# Patient Record
Sex: MALE | Race: BLACK OR AFRICAN AMERICAN | NOT HISPANIC OR LATINO | Employment: UNEMPLOYED | URBAN - METROPOLITAN AREA
[De-identification: names, ages, dates, MRNs, and addresses within clinical notes are randomized per-mention and may not be internally consistent; named-entity substitution may affect disease eponyms.]

---
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
## 2024-09-23 ENCOUNTER — HOSPITAL ENCOUNTER (EMERGENCY)
Facility: HOSPITAL | Age: 19
Discharge: HOME | End: 2024-09-23

## 2024-09-23 VITALS
HEART RATE: 57 BPM | RESPIRATION RATE: 16 BRPM | DIASTOLIC BLOOD PRESSURE: 78 MMHG | TEMPERATURE: 98.5 F | SYSTOLIC BLOOD PRESSURE: 141 MMHG | OXYGEN SATURATION: 100 %

## 2024-09-23 DIAGNOSIS — K04.7 DENTAL INFECTION: Primary | ICD-10-CM

## 2024-09-23 PROCEDURE — 99282 EMERGENCY DEPT VISIT SF MDM: CPT

## 2024-09-23 PROCEDURE — 99284 EMERGENCY DEPT VISIT MOD MDM: CPT

## 2024-09-23 RX ORDER — OXYCODONE HYDROCHLORIDE 5 MG/1
2.5 TABLET ORAL EVERY 4 HOURS PRN
Qty: 5 TABLET | Refills: 0 | Status: SHIPPED | OUTPATIENT
Start: 2024-09-23 | End: 2024-09-26

## 2024-09-23 RX ORDER — OXYCODONE HYDROCHLORIDE 5 MG/1
5 TABLET ORAL ONCE
Status: COMPLETED | OUTPATIENT
Start: 2024-09-23 | End: 2024-09-23

## 2024-09-23 RX ADMIN — AMOXICILLIN AND CLAVULANATE POTASSIUM 1 TABLET: 875; 125 TABLET, FILM COATED ORAL at 02:25

## 2024-09-23 RX ADMIN — OXYCODONE HYDROCHLORIDE 5 MG: 5 TABLET ORAL at 02:25

## 2024-09-23 NOTE — DISCHARGE INSTRUCTIONS
Please take 650mg acetaminophen every 6 hours as needed for pain.  You may also take 400mg ibuprofen(motrin) every 8 hours as needed for pain.

## 2024-10-07 ENCOUNTER — OFFICE VISIT (OUTPATIENT)
Dept: URGENT CARE | Facility: CLINIC | Age: 19
End: 2024-10-07

## 2024-10-07 VITALS
DIASTOLIC BLOOD PRESSURE: 73 MMHG | HEART RATE: 66 BPM | OXYGEN SATURATION: 100 % | RESPIRATION RATE: 18 BRPM | TEMPERATURE: 98 F | BODY MASS INDEX: 23.88 KG/M2 | SYSTOLIC BLOOD PRESSURE: 126 MMHG | WEIGHT: 181 LBS

## 2024-10-07 DIAGNOSIS — S46.911A MUSCLE STRAIN OF RIGHT SHOULDER REGION, INITIAL ENCOUNTER: Primary | ICD-10-CM

## 2024-10-07 PROCEDURE — 99213 OFFICE O/P EST LOW 20 MIN: CPT

## 2024-10-07 NOTE — PROGRESS NOTES
[facility]        NAME: Abdias Morrow is a 19 y.o. male  : 2005    MRN: [id number]  DATE: 2024  TIME: 4:14 PM    Assessment and Plan   Muscle strain of right shoulder region, initial encounter [S46.911A]  1. Muscle strain of right shoulder region, initial encounter              Patient Instructions   Ice to affected area first 48 hours, heat afterwards. 15 minutes every 3 hours as needed throughout the day.  Topical pain medication such as icy/hot, Biofreeze, Salon pas, etc.  Ibuprofen - 600 mg orally every 6-8 hours when required, maximum 2400 mg/day OR Naproxen - 500 mg orally twice daily when required, maximum 1250 mg/day    Acetaminophen - 650 mg orally every 4-6 hours when required, maximum 4000 mg/day      Follow up with Primary Care Provider in 3-5 days if not improving.  Proceed to Emergency Department if symptoms worsen.    If tests have been performed at [facility], our office will contact you with results if changes need to be made to the care plan discussed with you at the visit.  You can review your full results on [facility].    Chief Complaint     Chief Complaint   Patient presents with    Shoulder Pain     Right shoulder pain - was at [employer] yesterday  Requests work note         History of Present Illness       Patient reports he was at the [employer] range yesterday and shot 10 rounds of an AK 10.  He states the recoil hit him multiple times in the shoulder and today he has been having some swelling and some pain.  He does have full range of motion, denies any numbness or tingling in his hands and also denies any neck pain.    Shoulder Pain   Pertinent negatives include no fever.       Review of Systems   Review of Systems   Constitutional:  Negative for chills and fever.   Eyes:  Negative for pain and visual disturbance.   Respiratory:  Negative for cough and shortness of breath.    Cardiovascular:  Negative for chest pain and palpitations.   Gastrointestinal:   Negative for abdominal pain and vomiting.   Musculoskeletal:  Positive for joint swelling and myalgias. Negative for arthralgias and back pain.   Skin:  Negative for color change and rash.   Neurological:  Negative for dizziness, seizures, syncope, weakness and light-headedness.   All other systems reviewed and are negative.        Current Medications       Current Outpatient Medications:     naproxen (NAPROSYN) 500 mg tablet, Take 1 tablet (500 mg total) by mouth 2 (two) times a day with meals for 5 days, Disp: 10 tablet, Rfl: 0    Current Allergies     Allergies as of 10/07/2024    (No Known Allergies)            The following portions of the patient's history were reviewed and updated as appropriate: allergies, current medications, past family history, past medical history, past social history, past surgical history and problem list.     History reviewed. No pertinent past medical history.    History reviewed. No pertinent surgical history.    History reviewed. No pertinent family history.      Medications have been verified.        Objective   /73   Pulse 66   Temp 98 °F (36.7 °C)   Resp 18   Wt 82.1 kg (181 lb)   SpO2 100%   BMI 23.88 kg/m²   No LMP for male patient.       Physical Exam     Physical Exam  Vitals and nursing note reviewed.   Constitutional:       Appearance: Normal appearance.   HENT:      Head: Normocephalic and atraumatic.   Pulmonary:      Effort: Pulmonary effort is normal.   Musculoskeletal:        Arms:    Skin:     General: Skin is warm and dry.      Capillary Refill: Capillary refill takes less than 2 seconds.   Neurological:      General: No focal deficit present.      Mental Status: He is alert and oriented to person, place, and time. Mental status is at baseline.      Sensory: No sensory deficit.      Motor: No weakness.   Psychiatric:         Mood and Affect: Mood normal.         Behavior: Behavior normal.         Thought Content: Thought content normal.

## 2024-10-07 NOTE — LETTER
October 7, 2024     Patient: Abdias Morrow   YOB: 2005   Date of Visit: 10/7/2024       To Whom it May Concern:    Abdias Morrow was seen in my clinic on 10/7/2024. He may return to work on 10/9/2024 but may return sooner if his symptoms has improved before then.    If you have any questions or concerns, please don't hesitate to call.         Sincerely,          EDU Wiley        CC: No Recipients

## 2024-10-07 NOTE — PATIENT INSTRUCTIONS
Ice to affected area first 48 hours, heat afterwards. 15 minutes every 3 hours as needed throughout the day.  Topical pain medication such as icy/hot, Biofreeze, Salon pas, etc.  Ibuprofen - 600 mg orally every 6-8 hours when required, maximum 2400 mg/day OR Naproxen - 500 mg orally twice daily when required, maximum 1250 mg/day    Acetaminophen - 650 mg orally every 4-6 hours when required, maximum 4000 mg/day      Follow up with Primary Care Provider in 3-5 days if not improving.  Proceed to Emergency Department if symptoms worsen.    If tests have been performed at [facility], our office will contact you with results if changes need to be made to the care plan discussed with you at the visit.  You can review your full results on [facility] MyChart.

## 2024-10-12 NOTE — ED PROVIDER NOTES
Time reflects when diagnosis was documented in both MDM as applicable and the Disposition within this note       Time User Action Codes Description Comment    9/23/2024  2:12 AM Gloria Mosqueda Add [K04.7] Dental infection           ED Disposition       ED Disposition   Discharge    Condition   Stable    Date/Time   Mon Sep 23, 2024  2:12 AM    Comment   Abdias Morrow discharge to home/self care.                   Assessment & Plan       Medical Decision Making  Patient presents with left upper molar pain for the past several days, however worse over the past 3 hours.  Denies fever or chills, denies significant swelling of the face.  Denies difficulty breathing or swallowing.  Denies swelling of the tongue or throat.  Has history of significant dental caries and has been evaluated for the same in the past.  Physical exam with multiple left upper teeth with tenderness to percussion from the premolar through the molars with surrounding gingival swelling without discrete abscess.  No indication for labs or imaging given patient is afebrile and without evidence of facial swelling, oral swelling, or evidence of intracerebral spread of infection.  Patient declines dental block.  After discussion, will treat with Augmentin and short course of oxycodone for severe pain.  Patient is aware that he should be taking Tylenol and/or Motrin for mild to moderate pain and oxycodone is only to be used for severe pain.  Opioid and return precautions discussed.  Is to follow-up with OMFS within 1 week, referral placed.    Amount and/or Complexity of Data Reviewed  External Data Reviewed: notes.    Risk  Prescription drug management.             Medications   amoxicillin-clavulanate (AUGMENTIN) 875-125 mg per tablet 1 tablet (1 tablet Oral Given 9/23/24 0225)   oxyCODONE (ROXICODONE) IR tablet 5 mg (5 mg Oral Given 9/23/24 0225)       ED Risk Strat Scores             CRAFFT      Flowsheet Row Most Recent Value   CRAFFT Initial  "Screen: During the past 12 months, did you:    1. Drink any alcohol (more than a few sips)?  No Filed at: 09/23/2024 0223   2. Smoke any marijuana or hashish No Filed at: 09/23/2024 0223   3. Use anything else to get high? (\"anything else\" includes illegal drugs, over the counter and prescription drugs, and things that you sniff or 'burdick')? No Filed at: 09/23/2024 0223                                          History of Present Illness       Chief Complaint   Patient presents with    Dental Pain     Pt reports left sided dental pain x3 hrs        History reviewed. No pertinent past medical history.   History reviewed. No pertinent surgical history.   History reviewed. No pertinent family history.   Social History     Tobacco Use    Smoking status: Never    Smokeless tobacco: Never   Vaping Use    Vaping status: Every Day      E-Cigarette/Vaping    E-Cigarette Use Current Every Day User       E-Cigarette/Vaping Substances      I have reviewed and agree with the history as documented.     Tres is a 19-year-old male with no known medical problems who presents with left upper dental pain.  Had onset of mild left upper dental pain about 3 days ago which was nonradiating, pain became acutely severe about 3 hours ago after eating.  Has surrounding swelling of his gums but does not feel any swelling in the throat, of the tongue, or of the face.  Denies fever, chills, lymphadenopathy, neck stiffness, pain with extraocular movements, rash, or difficulty breathing.  Has had similar symptoms in the past and was advised to get these teeth removed, however has not been able to follow-up for same.  No relief with both Tylenol and Motrin taken earlier in the evening.        Review of Systems   Constitutional: Negative.  Negative for chills, fatigue and fever.   HENT:  Positive for dental problem. Negative for congestion, facial swelling, postnasal drip, rhinorrhea, sinus pressure, sinus pain, sneezing, sore throat, trouble " swallowing and voice change.    Eyes: Negative.  Negative for photophobia, pain, discharge, redness, itching and visual disturbance.   Respiratory: Negative.  Negative for shortness of breath and stridor.    Cardiovascular: Negative.    Gastrointestinal: Negative.  Negative for abdominal pain, nausea and vomiting.   Endocrine: Negative.    Genitourinary: Negative.    Musculoskeletal: Negative.  Negative for arthralgias, myalgias, neck pain and neck stiffness.   Skin: Negative.  Negative for pallor and rash.   Allergic/Immunologic: Negative.    Neurological: Negative.  Negative for dizziness, syncope, facial asymmetry, speech difficulty, weakness, light-headedness, numbness and headaches.   Hematological: Negative.  Negative for adenopathy.   Psychiatric/Behavioral: Negative.     All other systems reviewed and are negative.          Objective       ED Triage Vitals [09/23/24 0202]   Temperature Pulse Blood Pressure Respirations SpO2 Patient Position - Orthostatic VS   98.5 °F (36.9 °C) 57 141/78 16 100 % --      Temp Source Heart Rate Source BP Location FiO2 (%) Pain Score    Temporal Monitor Left arm -- 8      Vitals      Date and Time Temp Pulse SpO2 Resp BP Pain Score FACES Pain Rating User   09/23/24 0225 -- -- -- -- -- 8 -- MD   09/23/24 0202 98.5 °F (36.9 °C) 57 100 % 16 141/78 8 -- MD            Physical Exam  Vitals and nursing note reviewed. Exam conducted with a chaperone present.   Constitutional:       General: He is not in acute distress.     Appearance: Normal appearance. He is not ill-appearing or diaphoretic.   HENT:      Head: Normocephalic and atraumatic.      Right Ear: Tympanic membrane and external ear normal.      Left Ear: Tympanic membrane and external ear normal.      Nose: Nose normal.      Mouth/Throat:      Mouth: Mucous membranes are moist.      Pharynx: Oropharynx is clear. No oropharyngeal exudate.      Comments: Significant tenderness to percussion with surrounding gingival erythema  and swelling of teeth 13-16.  No abscess noted.  Airway grossly patent, no lingular or oropharyngeal edema.  Eyes:      General: No scleral icterus.        Right eye: No discharge.         Left eye: No discharge.      Extraocular Movements: Extraocular movements intact.      Conjunctiva/sclera: Conjunctivae normal.   Cardiovascular:      Rate and Rhythm: Normal rate and regular rhythm.      Pulses: Normal pulses.      Heart sounds: Normal heart sounds. No murmur heard.     No friction rub. No gallop.   Pulmonary:      Effort: Pulmonary effort is normal. No respiratory distress.      Breath sounds: Normal breath sounds.   Abdominal:      General: Abdomen is flat.   Musculoskeletal:         General: Normal range of motion.      Cervical back: Normal range of motion and neck supple. No rigidity or tenderness.   Lymphadenopathy:      Cervical: No cervical adenopathy.   Skin:     General: Skin is warm and dry.      Capillary Refill: Capillary refill takes less than 2 seconds.      Coloration: Skin is not pale.      Findings: No rash.   Neurological:      General: No focal deficit present.      Mental Status: He is alert.      Sensory: No sensory deficit.   Psychiatric:         Mood and Affect: Mood normal.         Behavior: Behavior normal.         Results Reviewed       None            No orders to display       Procedures    ED Medication and Procedure Management   Prior to Admission Medications   Prescriptions Last Dose Informant Patient Reported? Taking?   naproxen (NAPROSYN) 500 mg tablet   No No   Sig: Take 1 tablet (500 mg total) by mouth 2 (two) times a day with meals for 5 days      Facility-Administered Medications: None     Discharge Medication List as of 9/23/2024  2:33 AM        START taking these medications    Details   amoxicillin-clavulanate (AUGMENTIN) 875-125 mg per tablet Take 1 tablet by mouth every 12 (twelve) hours for 10 days, Starting Mon 9/23/2024, Until Thu 10/3/2024, Normal      oxyCODONE  (Roxicodone) 5 immediate release tablet Take 0.5 tablets (2.5 mg total) by mouth every 4 (four) hours as needed for moderate pain for up to 3 days Max Daily Amount: 15 mg, Starting Mon 9/23/2024, Until Thu 9/26/2024 at 2359, Normal           CONTINUE these medications which have NOT CHANGED    Details   naproxen (NAPROSYN) 500 mg tablet Take 1 tablet (500 mg total) by mouth 2 (two) times a day with meals for 5 days, Starting Sun 10/28/2018, Until Fri 11/2/2018, Print             ED SEPSIS DOCUMENTATION   Time reflects when diagnosis was documented in both MDM as applicable and the Disposition within this note       Time User Action Codes Description Comment    9/23/2024  2:12 AM Gloria Mosqueda Add [K04.7] Dental infection                  Gloria Mosqueda,   10/12/24 1006

## 2024-11-04 ENCOUNTER — APPOINTMENT (OUTPATIENT)
Dept: URGENT CARE | Facility: CLINIC | Age: 19
End: 2024-11-04

## 2024-11-18 ENCOUNTER — OFFICE VISIT (OUTPATIENT)
Dept: URGENT CARE | Facility: CLINIC | Age: 19
End: 2024-11-18

## 2024-11-18 VITALS
OXYGEN SATURATION: 100 % | RESPIRATION RATE: 20 BRPM | TEMPERATURE: 99.6 F | DIASTOLIC BLOOD PRESSURE: 78 MMHG | HEART RATE: 84 BPM | SYSTOLIC BLOOD PRESSURE: 124 MMHG

## 2024-11-18 DIAGNOSIS — K13.79 OTHER LESIONS OF ORAL MUCOSA: ICD-10-CM

## 2024-11-18 DIAGNOSIS — J02.8 ACUTE PHARYNGITIS DUE TO OTHER SPECIFIED ORGANISMS: ICD-10-CM

## 2024-11-18 DIAGNOSIS — B34.9 ACUTE VIRAL SYNDROME: ICD-10-CM

## 2024-11-18 DIAGNOSIS — Z75.8 DOES NOT HAVE PRIMARY CARE PROVIDER: ICD-10-CM

## 2024-11-18 DIAGNOSIS — R50.9 FEVER, UNSPECIFIED FEVER CAUSE: Primary | ICD-10-CM

## 2024-11-18 LAB — S PYO AG THROAT QL: NEGATIVE

## 2024-11-18 PROCEDURE — 87880 STREP A ASSAY W/OPTIC: CPT | Performed by: NURSE PRACTITIONER

## 2024-11-18 PROCEDURE — 87070 CULTURE OTHR SPECIMN AEROBIC: CPT | Performed by: NURSE PRACTITIONER

## 2024-11-18 PROCEDURE — 99214 OFFICE O/P EST MOD 30 MIN: CPT | Performed by: NURSE PRACTITIONER

## 2024-11-18 PROCEDURE — 87636 SARSCOV2 & INF A&B AMP PRB: CPT | Performed by: NURSE PRACTITIONER

## 2024-11-18 PROCEDURE — 87529 HSV DNA AMP PROBE: CPT | Performed by: NURSE PRACTITIONER

## 2024-11-18 RX ORDER — DIPHENHYDRAMINE HYDROCHLORIDE AND LIDOCAINE HYDROCHLORIDE AND ALUMINUM HYDROXIDE AND MAGNESIUM HYDRO
10 KIT EVERY 4 HOURS PRN
Qty: 180 ML | Refills: 0 | Status: SHIPPED | OUTPATIENT
Start: 2024-11-18

## 2024-11-18 NOTE — PROGRESS NOTES
[facility]        NAME: Abdias Morrow is a 19 y.o. male  : 2005    MRN: [id number]  DATE: 2024  TIME: 4:56 PM    Assessment and Plan   Fever, unspecified fever cause [R50.9]  1. Fever, unspecified fever cause  Covid/Flu- Office Collect Normal    HSV TYPE 1,2 DNA PCR [facility] ONLY    HSV TYPE 1,2 DNA PCR [facility] ONLY    Covid/Flu- Office Collect Normal    Ambulatory referral to Family Practice      2. Acute viral syndrome  Ambulatory referral to Family Practice      3. Other lesions of oral mucosa  HSV TYPE 1,2 DNA PCR [facility] ONLY    diphenhydramine, lidocaine, Al/Mg hydroxide, simethicone (Magic Mouthwash) SUSP    HSV TYPE 1,2 DNA PCR [facility] ONLY    Ambulatory referral to Family Practice      4. Acute pharyngitis due to other specified organisms  POCT rapid ANTIGEN strepA    Throat culture    HSV TYPE 1,2 DNA PCR [facility] ONLY    diphenhydramine, lidocaine, Al/Mg hydroxide, simethicone (Magic Mouthwash) SUSP    HSV TYPE 1,2 DNA PCR [facility] ONLY    Throat culture    Ambulatory referral to Family Practice      5. Does not have primary care provider  Ambulatory referral to Family Practice            Patient Instructions       Follow up with PCP in 3-5 days.  Proceed to  ER if symptoms worsen.    If tests have been performed at [facility], our office will contact you with results if changes need to be made to the care plan discussed with you at the visit.  You can review your full results on [facility].    Your strep A is negative. You have a throat culture pending. You are to download  [place] for the results in 3-4 days.  You will be notified if the results are + and an antibiotic will be called in for you.    You are to do warm salt water gargles 4 x daily.  Drink warm tea with honey and lemon.  Take tylenol or motrin as able for pain or fever.  Chloraseptic throat spray, cough drops.  Do not share utensils.  Change your tooth brush in 3 days.  Follow up  with your PCP in 2-3 days  Go to the ED if symptoms worsen      You have a flu/covid test pending.  You have a herpes oral test pending.   If tests have been performed at [facility], our office will contact you with results if changes need to be made to the care plan discussed with you at the visit.  You can review your full results on [facility]'s MyChart.    Your symptoms appear to be viral.   Rest. Stay hydrated.  Use the magic mouth wash  as ordered.      Chief Complaint     Chief Complaint   Patient presents with    Fever     Temp 104 2 days ago     Sore Throat     Sore throat and mouth  sores . Took one clindamycin     Dizziness     Dizzy when first wakes up and eyes hurt when look around     Leg Pain     Had otilio calf pain yesterday when took a shower , better today          History of Present Illness       This is a 19 year old male who states that 2 days ago had a temp of 104.  He has not had any more since.  He has a sorethroat and mouth sores.  States he had clindamycin at home and took one w/o relief.  He states that he is dizzy when he first wakes up and looks around and his eyes hurt.  He states he feels week with b/l calf pain yesterday.  He states he is feeling better today.  He state he is having trouble with eating.  Denies having a PCP. Denies rash on hands and feet.  He denies travel. PMH is listed and reviewed.          Review of Systems   Review of Systems   Constitutional:  Positive for fatigue and fever.   HENT:  Positive for congestion, mouth sores and sore throat.    Eyes: Negative.    Respiratory: Negative.     Cardiovascular: Negative.    Gastrointestinal: Negative.    Endocrine: Negative.    Genitourinary: Negative.    Musculoskeletal:  Positive for myalgias.   Skin: Negative.    Allergic/Immunologic: Negative.    Neurological:  Positive for dizziness.   Hematological: Negative.    Psychiatric/Behavioral: Negative.           Current Medications       Current Outpatient Medications:      diphenhydramine, lidocaine, Al/Mg hydroxide, simethicone (Magic Mouthwash) SUSP, Swish and spit 10 mL every 4 (four) hours as needed for mouth pain or discomfort or mucositis, Disp: 180 mL, Rfl: 0    naproxen (NAPROSYN) 500 mg tablet, Take 1 tablet (500 mg total) by mouth 2 (two) times a day with meals for 5 days, Disp: 10 tablet, Rfl: 0    Current Allergies     Allergies as of 11/18/2024    (No Known Allergies)            The following portions of the patient's history were reviewed and updated as appropriate: allergies, current medications, past family history, past medical history, past social history, past surgical history and problem list.     History reviewed. No pertinent past medical history.    History reviewed. No pertinent surgical history.    History reviewed. No pertinent family history.      Medications have been verified.        Objective   /78   Pulse 84   Temp 99.6 °F (37.6 °C)   Resp 20   SpO2 100%   No LMP for male patient.       Physical Exam     Physical Exam  Vitals and nursing note reviewed.   Constitutional:       General: He is not in acute distress.     Appearance: He is well-developed and normal weight. He is not ill-appearing, toxic-appearing or diaphoretic.   HENT:      Head: Normocephalic and atraumatic.      Right Ear: Tympanic membrane and ear canal normal.      Left Ear: Tympanic membrane and ear canal normal.      Nose: No congestion or rhinorrhea.      Mouth/Throat:      Mouth: Mucous membranes are moist. Oral lesions present.      Pharynx: Uvula midline. No pharyngeal swelling, oropharyngeal exudate, posterior oropharyngeal erythema or uvula swelling.      Tonsils: No tonsillar exudate or tonsillar abscesses.      Comments: Oral sores on tongue, oral mucosa (hsv swab obtained)  Eyes:      Extraocular Movements:      Right eye: Normal extraocular motion.      Left eye: Normal extraocular motion.   Cardiovascular:      Rate and Rhythm: Normal rate and regular rhythm.       Heart sounds: Normal heart sounds. No murmur heard.  Pulmonary:      Effort: Pulmonary effort is normal. No respiratory distress.      Breath sounds: Normal breath sounds. No stridor. No wheezing, rhonchi or rales.   Chest:      Chest wall: No tenderness.   Musculoskeletal:      Cervical back: Normal range of motion and neck supple.   Lymphadenopathy:      Cervical: No cervical adenopathy.   Skin:     General: Skin is warm and dry.      Capillary Refill: Capillary refill takes less than 2 seconds.   Neurological:      General: No focal deficit present.      Mental Status: He is alert and oriented to person, place, and time.   Psychiatric:         Mood and Affect: Mood normal.         Behavior: Behavior normal.

## 2024-11-18 NOTE — PATIENT INSTRUCTIONS
Your strep A is negative. You have a throat culture pending. You are to download  mychart for the results in 3-4 days.  You will be notified if the results are + and an antibiotic will be called in for you.    You are to do warm salt water gargles 4 x daily.  Drink warm tea with honey and lemon.  Take tylenol or motrin as able for pain or fever.  Chloraseptic throat spray, cough drops.  Do not share utensils.  Change your tooth brush in 3 days.  Follow up with your PCP in 2-3 days  Go to the ED if symptoms worsen      You have a flu/covid test pending.  You have a herpes oral test pending.   If tests have been performed at [facility], our office will contact you with results if changes need to be made to the care plan discussed with you at the visit.  You can review your full results on [facility] MyChart.    Your symptoms appear to be viral.   Rest. Stay hydrated.  Use the magic mouth wash  as ordered.

## 2024-11-19 ENCOUNTER — RESULTS FOLLOW-UP (OUTPATIENT)
Dept: URGENT CARE | Facility: CLINIC | Age: 19
End: 2024-11-19

## 2024-11-19 DIAGNOSIS — B00.9 HSV-1 INFECTION: Primary | ICD-10-CM

## 2024-11-19 LAB
FLUAV RNA RESP QL NAA+PROBE: NEGATIVE
FLUBV RNA RESP QL NAA+PROBE: NEGATIVE
HSV1 DNA SPEC QL NAA+PROBE: DETECTED
HSV1 DNA SPEC QL NAA+PROBE: NOT DETECTED
SARS-COV-2 RNA RESP QL NAA+PROBE: NEGATIVE

## 2024-11-19 RX ORDER — ACYCLOVIR 400 MG/1
400 TABLET ORAL 3 TIMES DAILY
Qty: 30 TABLET | Refills: 0 | Status: SHIPPED | OUTPATIENT
Start: 2024-11-19 | End: 2024-11-22

## 2024-11-19 NOTE — RESULT ENCOUNTER NOTE
HSV 1 PCR Detected Abnormal   HSV 2 PCR Not Detected    Acyclovir faxed to pharmacy listed on EMR from 11/18/2024  LM on home phone # for pt to call back today  Mobile # disconnected   No PCP listed for follow up

## 2024-11-20 ENCOUNTER — TELEPHONE (OUTPATIENT)
Dept: URGENT CARE | Facility: CLINIC | Age: 19
End: 2024-11-20

## 2024-11-20 LAB — BACTERIA THROAT CULT: NORMAL

## 2024-11-20 NOTE — TELEPHONE ENCOUNTER
Pt returned call regarding lab results.  He states he saw his results on Mychart.  Informed pt that he will need to  antiviral at the pharmacy listed.  He verbalizes understanding.

## 2024-11-22 ENCOUNTER — HOSPITAL ENCOUNTER (EMERGENCY)
Facility: HOSPITAL | Age: 19
Discharge: HOME | End: 2024-11-22
Attending: EMERGENCY MEDICINE

## 2024-11-22 VITALS
RESPIRATION RATE: 16 BRPM | BODY MASS INDEX: 23.99 KG/M2 | DIASTOLIC BLOOD PRESSURE: 89 MMHG | WEIGHT: 181 LBS | HEIGHT: 73 IN | TEMPERATURE: 97.8 F | HEART RATE: 69 BPM | OXYGEN SATURATION: 100 % | SYSTOLIC BLOOD PRESSURE: 122 MMHG

## 2024-11-22 DIAGNOSIS — Z11.59 PCR DNA POSITIVE FOR HSV1: ICD-10-CM

## 2024-11-22 DIAGNOSIS — B00.9 PCR DNA POSITIVE FOR HSV1: ICD-10-CM

## 2024-11-22 DIAGNOSIS — T78.40XA ALLERGIC REACTION, INITIAL ENCOUNTER: Primary | ICD-10-CM

## 2024-11-22 PROCEDURE — 99284 EMERGENCY DEPT VISIT MOD MDM: CPT | Performed by: EMERGENCY MEDICINE

## 2024-11-22 PROCEDURE — 99282 EMERGENCY DEPT VISIT SF MDM: CPT

## 2024-11-22 RX ORDER — DIPHENHYDRAMINE HYDROCHLORIDE AND LIDOCAINE HYDROCHLORIDE AND ALUMINUM HYDROXIDE AND MAGNESIUM HYDRO
10 KIT ONCE
Status: COMPLETED | OUTPATIENT
Start: 2024-11-22 | End: 2024-11-22

## 2024-11-22 RX ORDER — LIDOCAINE HYDROCHLORIDE 20 MG/ML
15 SOLUTION OROPHARYNGEAL 4 TIMES DAILY PRN
Qty: 100 ML | Refills: 0 | Status: SHIPPED | OUTPATIENT
Start: 2024-11-22

## 2024-11-22 RX ORDER — DIPHENHYDRAMINE HCL 25 MG
25 TABLET ORAL ONCE
Status: COMPLETED | OUTPATIENT
Start: 2024-11-22 | End: 2024-11-22

## 2024-11-22 RX ORDER — VALACYCLOVIR HYDROCHLORIDE 1 G/1
1000 TABLET, FILM COATED ORAL 3 TIMES DAILY
Qty: 30 TABLET | Refills: 0 | Status: SHIPPED | OUTPATIENT
Start: 2024-11-22 | End: 2024-12-02

## 2024-11-22 RX ORDER — EPINEPHRINE 0.3 MG/.3ML
0.3 INJECTION SUBCUTANEOUS ONCE AS NEEDED
Qty: 0.6 ML | Refills: 0 | Status: SHIPPED | OUTPATIENT
Start: 2024-11-22

## 2024-11-22 RX ADMIN — DIPHENHYDRAMINE HYDROCHLORIDE AND LIDOCAINE HYDROCHLORIDE AND ALUMINUM HYDROXIDE AND MAGNESIUM HYDRO 10 ML: KIT at 06:28

## 2024-11-22 RX ADMIN — DIPHENHYDRAMINE HYDROCHLORIDE 25 MG: 25 TABLET ORAL at 06:28

## 2024-11-22 NOTE — ED PROVIDER NOTES
Time reflects when diagnosis was documented in both MDM as applicable and the Disposition within this note       Time User Action Codes Description Comment    11/22/2024  6:26 AM Check, Hawk Add [Z11.59,  B00.9] PCR DNA positive for HSV1     11/22/2024  6:26 AM Check, Hawk Add [T78.40XA] Allergic reaction, initial encounter     11/22/2024  6:32 AM CheckHawk Modify [Z11.59,  B00.9] PCR DNA positive for HSV1     11/22/2024  6:32 AM Check, Hawk Modify [T78.40XA] Allergic reaction, initial encounter           ED Disposition       ED Disposition   Discharge    Condition   Stable    Date/Time   Fri Nov 22, 2024  6:29 AM    Comment   Abdias Morrow discharge to home/self care.                   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  Suspect symptoms likely secondary to allergic reaction to the acyclovir, no evidence of anaphylaxis.  Will treat with Benadryl, Magic mouthwash.  Will have him stop the acyclovir and transition to Valtrex.  Will also provide with prescription for the components of the Magic mouthwash to see if that will be more affordable for him, will also give him a prescription for an EpiPen.  He was also advised to take Benadryl as needed for itching .he was advised to return for any worsening symptoms.  He was given strict return precautions.    Problems Addressed:  Allergic reaction, initial encounter: acute illness or injury  PCR DNA positive for HSV1: acute illness or injury    Amount and/or Complexity of Data Reviewed  Independent Historian: friend  External Data Reviewed: labs and notes.    Risk  OTC drugs.  Prescription drug management.        ED Course as of 11/22/24 0642   Fri Nov 22, 2024   0618 EKG interpreted by myself demonstrates sinus tachycardia with a rate of 109, normal axis, normal intervals, normal ST segment and T waves       Medications   diphenhydramine, lidocaine,  "Al/Mg hydroxide, simethicone (Magic Mouthwash) oral solution 10 mL (10 mL Swish & Spit Given 11/22/24 0628)   diphenhydrAMINE (BENADRYL) tablet 25 mg (25 mg Oral Given 11/22/24 0628)       ED Risk Strat Scores             CRAFFT      Flowsheet Row Most Recent Value   CRAFFT Initial Screen: During the past 12 months, did you:    1. Drink any alcohol (more than a few sips)?  No Filed at: 11/22/2024 0616   2. Smoke any marijuana or hashish No Filed at: 11/22/2024 0616   3. Use anything else to get high? (\"anything else\" includes illegal drugs, over the counter and prescription drugs, and things that you sniff or 'burdick')? No Filed at: 11/22/2024 0616                                          History of Present Illness       Chief Complaint   Patient presents with    Itching     Itching/hives since starting acyclovir 2 days ago. Positive for HSV1       History reviewed. No pertinent past medical history.   History reviewed. No pertinent surgical history.   History reviewed. No pertinent family history.   Social History     Tobacco Use    Smoking status: Never    Smokeless tobacco: Never   Vaping Use    Vaping status: Every Day      E-Cigarette/Vaping    E-Cigarette Use Current Every Day User       E-Cigarette/Vaping Substances      I have reviewed and agree with the history as documented.     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  Patient states he took a total of 6 doses of the acyclovir, yesterday started with a red slightly raised itchy rash which prompted his evaluation.  He denies any facial swelling, difficulty swallowing or voice change, no chest tightness, shortness of breath, abdominal pain, nausea or vomiting.  No known allergies to anything.  Denies any other new exposures.  Continues with the lesions on his lips and tongue that are painful.  Was unable to  the Magic mouthwash as it was not covered by " insurance and was too expensive.        Review of Systems   Constitutional:  Negative for chills and fever.   HENT:  Positive for mouth sores. Negative for ear pain and sore throat.    Eyes:  Negative for pain and visual disturbance.   Respiratory:  Negative for cough and shortness of breath.    Cardiovascular:  Negative for chest pain and palpitations.   Gastrointestinal:  Negative for abdominal pain and vomiting.   Genitourinary:  Negative for dysuria and hematuria.   Musculoskeletal:  Negative for arthralgias and back pain.   Skin:  Positive for rash. Negative for color change.   Neurological:  Negative for seizures and syncope.   All other systems reviewed and are negative.          Objective       ED Triage Vitals [11/22/24 0614]   Temperature Pulse Blood Pressure Respirations SpO2 Patient Position - Orthostatic VS   97.8 °F (36.6 °C) 69 122/89 16 100 % Sitting      Temp Source Heart Rate Source BP Location FiO2 (%) Pain Score    Temporal Monitor Left arm -- 8      Vitals      Date and Time Temp Pulse SpO2 Resp BP Pain Score FACES Pain Rating User   11/22/24 0623 -- -- -- -- -- 4 -- SV   11/22/24 0614 97.8 °F (36.6 °C) 69 100 % 16 122/89 8 -- TG            Physical Exam  Vitals and nursing note reviewed.   Constitutional:       General: He is not in acute distress.  HENT:      Head: Normocephalic and atraumatic.      Right Ear: External ear normal.      Left Ear: External ear normal.      Nose: Nose normal.      Mouth/Throat:      Mouth: Mucous membranes are moist.      Pharynx: No oropharyngeal exudate.      Comments: Herpetic lesions on the tongue and lips  Eyes:      Extraocular Movements: Extraocular movements intact.      Conjunctiva/sclera: Conjunctivae normal.      Pupils: Pupils are equal, round, and reactive to light.   Cardiovascular:      Rate and Rhythm: Normal rate and regular rhythm.      Pulses: Normal pulses.      Heart sounds: Normal heart sounds. No murmur heard.  Pulmonary:      Effort:  Pulmonary effort is normal.      Breath sounds: Normal breath sounds. No stridor. No wheezing.   Musculoskeletal:         General: No deformity. Normal range of motion.      Cervical back: Normal range of motion and neck supple.   Skin:     General: Skin is warm and dry.      Capillary Refill: Capillary refill takes less than 2 seconds.      Comments: Urticarial rash on the extremities, chest, abdomen, and back   Neurological:      General: No focal deficit present.      Mental Status: He is alert and oriented to person, place, and time.   Psychiatric:         Mood and Affect: Mood normal.         Behavior: Behavior normal.         Results Reviewed       None            No orders to display       Procedures    ED Medication and Procedure Management   Prior to Admission Medications   Prescriptions Last Dose Informant Patient Reported? Taking?   diphenhydramine, lidocaine, Al/Mg hydroxide, simethicone (Magic Mouthwash) SUSP   No No   Sig: Swish and spit 10 mL every 4 (four) hours as needed for mouth pain or discomfort or mucositis   naproxen (NAPROSYN) 500 mg tablet   No No   Sig: Take 1 tablet (500 mg total) by mouth 2 (two) times a day with meals for 5 days      Facility-Administered Medications: None     Patient's Medications   Discharge Prescriptions    ALUMINUM-MAGNESIUM HYDROXIDE 200-200 MG/5ML SUSPENSION    Take 5 mL by mouth every 6 (six) hours as needed for heartburn (Mouth pain or discomfort) Take with the viscous lidocaine up to 4 times daily as needed for mouth pain, swish and spit       Start Date: 11/22/2024End Date: --       Order Dose: 5 mL       Quantity: 355 mL    Refills: 0    EPINEPHRINE (EPIPEN) 0.3 MG/0.3 ML SOAJ    Inject 0.3 mL (0.3 mg total) into a muscle once as needed for anaphylaxis for up to 1 dose       Start Date: 11/22/2024End Date: --       Order Dose: 0.3 mg       Quantity: 0.6 mL    Refills: 0    LIDOCAINE VISCOUS HCL (XYLOCAINE) 2 % MUCOSAL SOLUTION    Swish and spit 15 mL 4 (four)  times a day as needed for mouth pain or discomfort       Start Date: 11/22/2024End Date: --       Order Dose: 15 mL       Quantity: 100 mL    Refills: 0    VALACYCLOVIR (VALTREX) 1,000 MG TABLET    Take 1 tablet (1,000 mg total) by mouth 3 (three) times a day for 10 days       Start Date: 11/22/2024End Date: 12/2/2024       Order Dose: 1,000 mg       Quantity: 30 tablet    Refills: 0     No discharge procedures on file.  ED SEPSIS DOCUMENTATION   Time reflects when diagnosis was documented in both MDM as applicable and the Disposition within this note       Time User Action Codes Description Comment    11/22/2024  6:26 AM CheckHawk [Z11.59,  B00.9] PCR DNA positive for HSV1     11/22/2024  6:26 AM Hawk Lancaster [T78.40XA] Allergic reaction, initial encounter     11/22/2024  6:32 AM CheckHawk [Z11.59,  B00.9] PCR DNA positive for HSV1     11/22/2024  6:32 AM CheckHawk [T78.40XA] Allergic reaction, initial encounter                  Hawk Lancaster MD  11/22/24 0642

## 2024-11-22 NOTE — DISCHARGE INSTRUCTIONS
Take Benadryl 25 mg every 6 hours as needed for allergic reaction, itching    Stop taking acyclovir, start taking Valtrex instead    Return to the emergency department if you experience worsening of symptoms including for develop any difficulty breathing, uncontrollable vomiting, facial swelling, or any other concerning symptoms    Use EpiPen as prescribed

## 2025-02-16 ENCOUNTER — HOSPITAL ENCOUNTER (EMERGENCY)
Facility: HOSPITAL | Age: 20
Discharge: HOME | End: 2025-02-16
Attending: EMERGENCY MEDICINE | Admitting: EMERGENCY MEDICINE

## 2025-02-16 VITALS
TEMPERATURE: 98.1 F | DIASTOLIC BLOOD PRESSURE: 78 MMHG | SYSTOLIC BLOOD PRESSURE: 163 MMHG | RESPIRATION RATE: 16 BRPM | OXYGEN SATURATION: 98 % | HEART RATE: 98 BPM

## 2025-02-16 DIAGNOSIS — B02.9 SHINGLES: Primary | ICD-10-CM

## 2025-02-16 PROCEDURE — 99284 EMERGENCY DEPT VISIT MOD MDM: CPT | Performed by: EMERGENCY MEDICINE

## 2025-02-16 PROCEDURE — 99282 EMERGENCY DEPT VISIT SF MDM: CPT

## 2025-02-16 RX ORDER — VALACYCLOVIR HYDROCHLORIDE 1 G/1
1000 TABLET, FILM COATED ORAL 3 TIMES DAILY
Qty: 42 TABLET | Refills: 0 | Status: SHIPPED | OUTPATIENT
Start: 2025-02-16 | End: 2025-03-02

## 2025-02-16 NOTE — ED PROVIDER NOTES
"Time reflects when diagnosis was documented in both MDM as applicable and the Disposition within this note       Time User Action Codes Description Comment    2/16/2025  8:51 AM Luis Alberto Barton Add [B02.9] Shingles           ED Disposition       ED Disposition   Discharge    Condition   Stable    Date/Time   Sun Feb 16, 2025  8:51 AM    Comment   Abdias Cristhian discharge to home/self care.                   Assessment & Plan       Medical Decision Making  3-year-old male presenting for rash to right leg, paresthesias in the right thigh.  Rash appears vesicular in nature.  Believe symptoms are secondary to shingles.  Is day 3 of the patient's symptoms  Original plan was to treat with valacyclovir.  Patient has had a generalized rash from acyclovir in the past.  He would like to hold off on antiviral treatment at this time.  Will write a prescription for valacyclovir if he decides to take it.  Told to return to the ED if symptoms worsen    Problems Addressed:  Shingles: acute illness or injury    Risk  Prescription drug management.        ED Course as of 02/16/25 0855   Sun Feb 16, 2025   0850 Plan was to start patient on valacyclovir.  Patient says that he developed a rash from acyclovir in the past.  Talk with him about the options.  Given that he is at 72 hours of symptoms options would be to treat with valacyclovir versus observation and symptomatic control.  Patient would like to hold off on the medication at this time.  Will write a prescription for him in case he decides to take it.       Medications - No data to display    ED Risk Strat Scores              CRAFFT      Flowsheet Row Most Recent Value   CRAFFT Initial Screen: During the past 12 months, did you:    1. Drink any alcohol (more than a few sips)?  No Filed at: 02/16/2025 0831   2. Smoke any marijuana or hashish No Filed at: 02/16/2025 0831   3. Use anything else to get high? (\"anything else\" includes illegal drugs, over the counter and prescription " drugs, and things that you sniff or 'burdick')? No Filed at: 02/16/2025 0831                                          History of Present Illness       Chief Complaint   Patient presents with    Blister     Blistering of right thigh         History reviewed. No pertinent past medical history.   History reviewed. No pertinent surgical history.   History reviewed. No pertinent family history.   Social History     Tobacco Use    Smoking status: Never    Smokeless tobacco: Never   Vaping Use    Vaping status: Every Day      E-Cigarette/Vaping    E-Cigarette Use Current Every Day User       E-Cigarette/Vaping Substances      I have reviewed and agree with the history as documented.     Patient is a 19-year-old male who presents for evaluation of a rash to his right leg as well as some numbness and tingling in the leg.  Patient says that this is the third day of the symptoms.  He says he feels like the numbness and tingling in the leg has gotten a little bit larger in area.  He says that he noticed the rash 3 days ago.  He says the rash has not gotten worse.  Denies any fevers or chills.  He also admits to some swollen lymph nodes in the right groin area.  He denies any testicular pain, penile swelling, penile lesions, penile discharge or any other genitourinary symptoms.  Patient says he has had cold sores in the past.        Review of Systems   Constitutional:  Negative for chills, fever and unexpected weight change.   HENT:  Negative for congestion, sore throat and trouble swallowing.    Eyes:  Negative for pain, discharge and itching.   Respiratory:  Negative for cough, chest tightness, shortness of breath and wheezing.    Cardiovascular:  Negative for chest pain, palpitations and leg swelling.   Gastrointestinal:  Negative for abdominal pain, blood in stool, diarrhea, nausea and vomiting.   Endocrine: Negative for polyuria.   Genitourinary:  Negative for difficulty urinating, dysuria, frequency and hematuria.    Musculoskeletal:  Negative for arthralgias and back pain.   Skin:  Positive for rash (R leg).   Neurological:  Positive for numbness (tingling to R thigh). Negative for dizziness, syncope, weakness, light-headedness and headaches.           Objective       ED Triage Vitals   Temperature Pulse Blood Pressure Respirations SpO2 Patient Position - Orthostatic VS   02/16/25 0829 02/16/25 0829 02/16/25 0830 02/16/25 0829 02/16/25 0829 02/16/25 0829   98.1 °F (36.7 °C) 98 163/78 16 98 % Sitting      Temp Source Heart Rate Source BP Location FiO2 (%) Pain Score    02/16/25 0829 02/16/25 0829 02/16/25 0829 -- 02/16/25 0829    Tympanic Monitor Left arm  No Pain      Vitals      Date and Time Temp Pulse SpO2 Resp BP Pain Score FACES Pain Rating User   02/16/25 0830 -- -- -- -- 163/78 -- -- NAD   02/16/25 0829 98.1 °F (36.7 °C) 98 98 % 16 -- No Pain -- NAD            Physical Exam  Vitals and nursing note reviewed.   Constitutional:       General: He is not in acute distress.     Appearance: He is well-developed.   HENT:      Head: Normocephalic and atraumatic.      Right Ear: External ear normal.      Left Ear: External ear normal.   Eyes:      Conjunctiva/sclera: Conjunctivae normal.      Pupils: Pupils are equal, round, and reactive to light.   Cardiovascular:      Rate and Rhythm: Normal rate and regular rhythm.      Heart sounds: Normal heart sounds. No murmur heard.     No friction rub. No gallop.   Pulmonary:      Effort: Pulmonary effort is normal. No respiratory distress.      Breath sounds: Normal breath sounds. No wheezing or rales.   Abdominal:      General: Bowel sounds are normal. There is no distension.      Palpations: Abdomen is soft.      Tenderness: There is no abdominal tenderness. There is no guarding.   Musculoskeletal:         General: No tenderness or deformity. Normal range of motion.      Cervical back: Normal range of motion.      Comments: R inguinal painless lymphadenopathy   Lymphadenopathy:       Cervical: No cervical adenopathy.   Skin:     General: Skin is warm and dry.      Findings: Rash present.      Comments: Vesicular rash to right lateral leg  No overlying signs of infection   Neurological:      General: No focal deficit present.      Mental Status: He is alert and oriented to person, place, and time. Mental status is at baseline.      Cranial Nerves: No cranial nerve deficit.      Sensory: No sensory deficit.      Motor: No weakness or abnormal muscle tone.   Psychiatric:         Behavior: Behavior normal.         Results Reviewed       None            No orders to display       Procedures    ED Medication and Procedure Management   Prior to Admission Medications   Prescriptions Last Dose Informant Patient Reported? Taking?   EPINEPHrine (EPIPEN) 0.3 mg/0.3 mL SOAJ   No No   Sig: Inject 0.3 mL (0.3 mg total) into a muscle once as needed for anaphylaxis for up to 1 dose   Lidocaine Viscous HCl (XYLOCAINE) 2 % mucosal solution   No No   Sig: Swish and spit 15 mL 4 (four) times a day as needed for mouth pain or discomfort   aluminum-magnesium hydroxide 200-200 MG/5ML suspension   No No   Sig: Take 5 mL by mouth every 6 (six) hours as needed for heartburn (Mouth pain or discomfort) Take with the viscous lidocaine up to 4 times daily as needed for mouth pain, swish and spit   diphenhydramine, lidocaine, Al/Mg hydroxide, simethicone (Magic Mouthwash) SUSP   No No   Sig: Swish and spit 10 mL every 4 (four) hours as needed for mouth pain or discomfort or mucositis   naproxen (NAPROSYN) 500 mg tablet   No No   Sig: Take 1 tablet (500 mg total) by mouth 2 (two) times a day with meals for 5 days   valACYclovir (VALTREX) 1,000 mg tablet   No No   Sig: Take 1 tablet (1,000 mg total) by mouth 3 (three) times a day for 10 days      Facility-Administered Medications: None     Patient's Medications   Discharge Prescriptions    VALACYCLOVIR (VALTREX) 1,000 MG TABLET    Take 1 tablet (1,000 mg total) by mouth 3  (three) times a day for 14 days       Start Date: 2/16/2025 End Date: 3/2/2025       Order Dose: 1,000 mg       Quantity: 42 tablet    Refills: 0     No discharge procedures on file.  ED SEPSIS DOCUMENTATION   Time reflects when diagnosis was documented in both MDM as applicable and the Disposition within this note       Time User Action Codes Description Comment    2/16/2025  8:51 AM Luis Alberto Barton Add [B02.9] Parth Barton DO  02/16/25 0855

## 2025-03-23 ENCOUNTER — HOSPITAL ENCOUNTER (EMERGENCY)
Facility: HOSPITAL | Age: 20
Discharge: HOME | End: 2025-03-23
Attending: EMERGENCY MEDICINE

## 2025-03-23 VITALS
RESPIRATION RATE: 20 BRPM | WEIGHT: 175 LBS | OXYGEN SATURATION: 100 % | BODY MASS INDEX: 23.09 KG/M2 | HEART RATE: 82 BPM | TEMPERATURE: 99.5 F

## 2025-03-23 DIAGNOSIS — K08.89 DENTALGIA: Primary | ICD-10-CM

## 2025-03-23 PROCEDURE — 99282 EMERGENCY DEPT VISIT SF MDM: CPT

## 2025-03-23 PROCEDURE — 99284 EMERGENCY DEPT VISIT MOD MDM: CPT | Performed by: EMERGENCY MEDICINE

## 2025-03-23 RX ORDER — ACETAMINOPHEN 325 MG/1
975 TABLET ORAL ONCE
Status: COMPLETED | OUTPATIENT
Start: 2025-03-23 | End: 2025-03-23

## 2025-03-23 RX ORDER — PENICILLIN V POTASSIUM 500 MG/1
500 TABLET, FILM COATED ORAL 4 TIMES DAILY
Qty: 40 TABLET | Refills: 0 | Status: SHIPPED | OUTPATIENT
Start: 2025-03-23 | End: 2025-03-23

## 2025-03-23 RX ORDER — OXYCODONE HYDROCHLORIDE 5 MG/1
5 TABLET ORAL ONCE
Refills: 0 | Status: COMPLETED | OUTPATIENT
Start: 2025-03-23 | End: 2025-03-23

## 2025-03-23 RX ORDER — PENICILLIN V POTASSIUM 250 MG/1
500 TABLET, FILM COATED ORAL ONCE
Status: COMPLETED | OUTPATIENT
Start: 2025-03-23 | End: 2025-03-23

## 2025-03-23 RX ORDER — PENICILLIN V POTASSIUM 500 MG/1
500 TABLET, FILM COATED ORAL 4 TIMES DAILY
Qty: 40 TABLET | Refills: 0 | Status: SHIPPED | OUTPATIENT
Start: 2025-03-23 | End: 2025-04-02

## 2025-03-23 RX ADMIN — OXYCODONE HYDROCHLORIDE 5 MG: 5 TABLET ORAL at 17:02

## 2025-03-23 RX ADMIN — PENICILLIN V POTASSIUM 500 MG: 250 TABLET, FILM COATED ORAL at 17:02

## 2025-03-23 RX ADMIN — ACETAMINOPHEN 975 MG: 325 TABLET ORAL at 17:07

## 2025-03-23 NOTE — ED PROVIDER NOTES
"Time reflects when diagnosis was documented in both MDM as applicable and the Disposition within this note       Time User Action Codes Description Comment    3/23/2025  4:58 PM JacobsOc Add [K08.89] Dentalgia           ED Disposition       ED Disposition   Discharge    Condition   Stable    Date/Time   Sun Mar 23, 2025  4:58 PM    Comment   Abdias Morrow discharge to home/self care.                   Assessment & Plan       Medical Decision Making  Risk  OTC drugs.  Prescription drug management.      Patient presents for dental pain due to suspected dental nelli. Patient not immunosuppressed, afebrile and well appearing with patent airway, have low suspicfion for deep space infection or any concern for airway compromise. Based on history, physical, and work up. No evidence of tooth fracture, avulsion, or bleeding socket. No evidence of RPA, PTA, Min’s angina, periapical abscess. Offered patient dental nerve block for pain which patient accepted/declined.     Instructed patient to continue to treat pain with ibuprofen/acetaminophen until they see a dentist. Patient discharged home and will follow up with dentist. Discussed return precautions for odontogenic infections and other dental pain emergencies. Will provide dental clinic list.     Portions of the record may have been created with voice recognition software. Occasional wrong word or \"sound a like\" substitutions may have occurred due to the inherent limitations of voice recognition software. Read the chart carefully and recognize, using context, where substitutions have occurred.        Medications   penicillin V potassium (VEETID) tablet 500 mg (500 mg Oral Given 3/23/25 1702)   oxyCODONE (ROXICODONE) IR tablet 5 mg (5 mg Oral Given 3/23/25 1702)   acetaminophen (TYLENOL) tablet 975 mg (975 mg Oral Given 3/23/25 1707)       ED Risk Strat Scores              CRAFFT      Flowsheet Row Most Recent Value   CRAFFT Initial Screen: During the past 12 " "months, did you:    1. Drink any alcohol (more than a few sips)?  No Filed at: 03/23/2025 1647   2. Smoke any marijuana or hashish No Filed at: 03/23/2025 1647   3. Use anything else to get high? (\"anything else\" includes illegal drugs, over the counter and prescription drugs, and things that you sniff or 'burdick')? No Filed at: 03/23/2025 1647                                          History of Present Illness       Chief Complaint   Patient presents with    Dental Pain     Right lower side         History reviewed. No pertinent past medical history.   History reviewed. No pertinent surgical history.   History reviewed. No pertinent family history.   Social History     Tobacco Use    Smoking status: Never    Smokeless tobacco: Never   Vaping Use    Vaping status: Every Day      E-Cigarette/Vaping    E-Cigarette Use Current Every Day User       E-Cigarette/Vaping Substances      I have reviewed and agree with the history as documented.     HPI      Nontoxic-appearing, 19-year-old, male, well-appearing, no relevant past medical history, presents to the emergency department with his significant other both of which are reliable competent and historians with no language barrier concerns, arrival by private vehicle.    Taking Tylenol Motrin without relief.  Has been going on for approximately a year worse over the last few days.  Patient was on a waiting list for oral surgery to have the molar removed, since his symptoms resolved intermittently over the few months he rescinded being on the list and now the pain is returned, and Emergency Department further evaluation.    No trismus, no dental trauma, no change in voice.    Review of Systems   Constitutional: Negative.  Negative for chills and fever.   HENT:  Positive for dental problem. Negative for congestion, ear pain, facial swelling, rhinorrhea, sore throat, trouble swallowing and voice change.    Eyes: Negative.    Respiratory: Negative.  Negative for chest tightness, " shortness of breath, wheezing and stridor.    Cardiovascular: Negative.  Negative for chest pain and leg swelling.   Gastrointestinal: Negative.  Negative for abdominal pain, diarrhea and vomiting.   Endocrine: Negative.    Genitourinary: Negative.    Musculoskeletal: Negative.    Skin: Negative.    Allergic/Immunologic: Negative.    Neurological: Negative.    Hematological: Negative.    Psychiatric/Behavioral: Negative.             Objective       ED Triage Vitals [03/23/25 1652]   Temperature Pulse BP Respirations SpO2 Patient Position - Orthostatic VS   99.5 °F (37.5 °C) 82 -- 20 100 % Sitting      Temp Source Heart Rate Source BP Location FiO2 (%) Pain Score    Temporal Monitor Left arm -- 7      Vitals      Date and Time Temp Pulse SpO2 Resp BP Pain Score FACES Pain Rating User   03/23/25 1702 -- -- -- -- -- 7 -- NAD   03/23/25 1652 99.5 °F (37.5 °C) 82 100 % 20 -- 7 -- NAD            Physical Exam  Vitals and nursing note reviewed.   Constitutional:       General: He is not in acute distress.     Appearance: Normal appearance. He is normal weight. He is not ill-appearing, toxic-appearing or diaphoretic.   HENT:      Head: Normocephalic and atraumatic.      Comments:   Ears appear normal.  External auditory canals patent without erythema or edema bilaterally.  TM grey/flat bilaterally.  Nose normal inspection, no deformity, nares patent bilaterally.  No septal hematoma, No epistaxis.  Mucous membranes moist, pink.  Tongue midline without edema.  Uvula midline without deviation.  Posterior pharynx widely patent.  No posterior erythema.  Tonsils without edema, erythema or purulent exudate.  No tongue or lip swelling present.  No defined dental abscess.  No sublingual or submandibular fullness or swelling.  No trismus.  No drooling or pooling of secretions. No stridor w/o evidence of brawniness under the tongue.      Right Ear: External ear normal.      Left Ear: External ear normal.      Nose: Nose normal.       Mouth/Throat:      Mouth: Mucous membranes are moist.        Comments: Between tooth #30 and 31, appears to have the gum over following the fractured tooth, no significant swelling along the line to suggest an abscess that could be drainable.  Eyes:      Extraocular Movements: Extraocular movements intact.      Conjunctiva/sclera: Conjunctivae normal.      Pupils: Pupils are equal, round, and reactive to light.   Cardiovascular:      Rate and Rhythm: Normal rate.      Pulses: Normal pulses.   Pulmonary:      Effort: Pulmonary effort is normal.   Abdominal:      General: Abdomen is flat. Bowel sounds are normal.   Musculoskeletal:         General: Normal range of motion.      Cervical back: Normal range of motion.   Skin:     General: Skin is warm.      Capillary Refill: Capillary refill takes less than 2 seconds.   Neurological:      General: No focal deficit present.      Mental Status: He is alert and oriented to person, place, and time.   Psychiatric:         Mood and Affect: Mood normal.         Behavior: Behavior normal.         Results Reviewed       None            No orders to display       Procedures    ED Medication and Procedure Management   Prior to Admission Medications   Prescriptions Last Dose Informant Patient Reported? Taking?   EPINEPHrine (EPIPEN) 0.3 mg/0.3 mL SOAJ   No No   Sig: Inject 0.3 mL (0.3 mg total) into a muscle once as needed for anaphylaxis for up to 1 dose   Lidocaine Viscous HCl (XYLOCAINE) 2 % mucosal solution   No No   Sig: Swish and spit 15 mL 4 (four) times a day as needed for mouth pain or discomfort   aluminum-magnesium hydroxide 200-200 MG/5ML suspension   No No   Sig: Take 5 mL by mouth every 6 (six) hours as needed for heartburn (Mouth pain or discomfort) Take with the viscous lidocaine up to 4 times daily as needed for mouth pain, swish and spit   diphenhydramine, lidocaine, Al/Mg hydroxide, simethicone (Magic Mouthwash) SUSP   No No   Sig: Swish and spit 10 mL every 4  (four) hours as needed for mouth pain or discomfort or mucositis   naproxen (NAPROSYN) 500 mg tablet   No No   Sig: Take 1 tablet (500 mg total) by mouth 2 (two) times a day with meals for 5 days   valACYclovir (VALTREX) 1,000 mg tablet   No No   Sig: Take 1 tablet (1,000 mg total) by mouth 3 (three) times a day for 10 days   valACYclovir (VALTREX) 1,000 mg tablet   No No   Sig: Take 1 tablet (1,000 mg total) by mouth 3 (three) times a day for 14 days      Facility-Administered Medications: None     Discharge Medication List as of 3/23/2025  5:13 PM        CONTINUE these medications which have CHANGED    Details   penicillin V potassium (VEETID) 500 mg tablet Take 1 tablet (500 mg total) by mouth 4 (four) times a day for 10 days, Starting Sun 3/23/2025, Until Wed 4/2/2025, Normal           CONTINUE these medications which have NOT CHANGED    Details   aluminum-magnesium hydroxide 200-200 MG/5ML suspension Take 5 mL by mouth every 6 (six) hours as needed for heartburn (Mouth pain or discomfort) Take with the viscous lidocaine up to 4 times daily as needed for mouth pain, swish and spit, Starting Fri 11/22/2024, Normal      diphenhydramine, lidocaine, Al/Mg hydroxide, simethicone (Magic Mouthwash) SUSP Swish and spit 10 mL every 4 (four) hours as needed for mouth pain or discomfort or mucositis, Starting Mon 11/18/2024, Normal      EPINEPHrine (EPIPEN) 0.3 mg/0.3 mL SOAJ Inject 0.3 mL (0.3 mg total) into a muscle once as needed for anaphylaxis for up to 1 dose, Starting Fri 11/22/2024, Normal      Lidocaine Viscous HCl (XYLOCAINE) 2 % mucosal solution Swish and spit 15 mL 4 (four) times a day as needed for mouth pain or discomfort, Starting Fri 11/22/2024, Normal      naproxen (NAPROSYN) 500 mg tablet Take 1 tablet (500 mg total) by mouth 2 (two) times a day with meals for 5 days, Starting Sun 10/28/2018, Until Fri 11/2/2018, Print      valACYclovir (VALTREX) 1,000 mg tablet Take 1 tablet (1,000 mg total) by mouth 3  (three) times a day for 10 days, Starting Fri 11/22/2024, Until Mon 12/2/2024, Normal      valACYclovir (VALTREX) 1,000 mg tablet Take 1 tablet (1,000 mg total) by mouth 3 (three) times a day for 14 days, Starting Sun 2/16/2025, Until Sun 3/2/2025, Normal             ED SEPSIS DOCUMENTATION   Time reflects when diagnosis was documented in both MDM as applicable and the Disposition within this note       Time User Action Codes Description Comment    3/23/2025  4:58 PM Oc Jacobs Add [K08.89] Dentalgia                  Oc Jacobs III, DO  03/23/25 1731